# Patient Record
Sex: MALE | URBAN - METROPOLITAN AREA
[De-identification: names, ages, dates, MRNs, and addresses within clinical notes are randomized per-mention and may not be internally consistent; named-entity substitution may affect disease eponyms.]

---

## 2022-11-22 ENCOUNTER — APPOINTMENT (RX ONLY)
Dept: URBAN - METROPOLITAN AREA CLINIC 80 | Facility: CLINIC | Age: 77
Setting detail: DERMATOLOGY
End: 2022-11-22

## 2022-11-22 DIAGNOSIS — I87.2 VENOUS INSUFFICIENCY (CHRONIC) (PERIPHERAL): ICD-10-CM

## 2022-11-22 PROCEDURE — ? PATIENT SPECIFIC COUNSELING

## 2022-11-22 PROCEDURE — 99203 OFFICE O/P NEW LOW 30 MIN: CPT

## 2022-11-22 PROCEDURE — ? COUNSELING

## 2022-11-22 ASSESSMENT — LOCATION SIMPLE DESCRIPTION DERM
LOCATION SIMPLE: RIGHT FOOT
LOCATION SIMPLE: LEFT PRETIBIAL REGION
LOCATION SIMPLE: LEFT FOOT
LOCATION SIMPLE: RIGHT PRETIBIAL REGION

## 2022-11-22 ASSESSMENT — LOCATION DETAILED DESCRIPTION DERM
LOCATION DETAILED: RIGHT DORSAL FOOT
LOCATION DETAILED: RIGHT DISTAL PRETIBIAL REGION
LOCATION DETAILED: LEFT DISTAL PRETIBIAL REGION
LOCATION DETAILED: LEFT DORSAL FOOT

## 2022-11-22 ASSESSMENT — LOCATION ZONE DERM
LOCATION ZONE: FEET
LOCATION ZONE: LEG

## 2022-11-22 NOTE — PROCEDURE: PATIENT SPECIFIC COUNSELING
Detail Level: Zone
Continue using the CerAve healing ointment and the compression stockings daily. Follow up with vascular surgeon and hip surgeon to identify cause of stasis dermatitis.

## 2022-11-22 NOTE — HPI: ITCHING
What Type Of Note Output Would You Prefer (Optional)?: Standard Output
How Did Your Itching Occur?: sudden in onset (over a period of weeks to a few months)
How Severe Is Your Itching?: mild
Additional History: Pt states he recently got a hip replacement. Pt states the itching started a month and a half after the surgery. While he was in a rehab, he got itching and swelling in the feet. His foot doctor prescribed a medication but it made the itching worse. His wound care recommended that he stop using Rx ointment and instead use CeraVe healing ointment. Since starting that ointment, he is having improvement and noticing less swelling.